# Patient Record
Sex: MALE | Race: WHITE
[De-identification: names, ages, dates, MRNs, and addresses within clinical notes are randomized per-mention and may not be internally consistent; named-entity substitution may affect disease eponyms.]

---

## 2021-03-18 ENCOUNTER — HOSPITAL ENCOUNTER (OUTPATIENT)
Dept: HOSPITAL 100 - IMMUN | Age: 61
End: 2021-03-18
Payer: COMMERCIAL

## 2021-03-18 DIAGNOSIS — Z23: Primary | ICD-10-CM

## 2021-03-18 PROCEDURE — 91300: CPT

## 2021-03-18 PROCEDURE — 0002A: CPT

## 2021-03-18 PROCEDURE — 0001A: CPT

## 2021-03-18 RX ADMIN — RNA INGREDIENT BNT-162B2 30 MCG: 0.23 INJECTION, SUSPENSION INTRAMUSCULAR at 10:02

## 2021-04-08 RX ADMIN — RNA INGREDIENT BNT-162B2 30 MCG: 0.23 INJECTION, SUSPENSION INTRAMUSCULAR at 09:49

## 2023-09-30 ENCOUNTER — NURSE TRIAGE (OUTPATIENT)
Dept: OTHER | Facility: CLINIC | Age: 63
End: 2023-09-30

## 2023-09-30 NOTE — TELEPHONE ENCOUNTER
Location of patient: [place]    Current Symptoms: Pt tested positive for Covid on an at home test yesterday. His symptoms include a cough, sinus pain, congestion, fever and body aches. He denies issues with breathing. He denies chronic medical issues. He has received a Covid vaccine. He would like to be prescribed Paxlovid. His doctor's office is closed today. Onset: Yesterday     Associated Symptoms: Decreased appetite    Temperature: 100.6-101.1F    What has been tried: Nothing yet    LMP: NA Pregnant: NA    Recommended disposition: See HCP within 4 Hours (or PCP triage)    Care advice provided, patient verbalizes understanding; denies any other questions or concerns; instructed to call back for any new or worsening symptoms. Patient/caller agrees to proceed to nearest [facility]     This triage is a result of a call to [address] [place] [address]. Please do not respond to the triage nurse through this encounter. Any subsequent communication should be directly with the patient.     Reason for Disposition   [1] Fever > 101 F (38.3 C) AND [2] age > 60 years    Protocols used: Coronavirus (COVID-19) Diagnosed or Suspected-ADULT-